# Patient Record
Sex: MALE | NOT HISPANIC OR LATINO | URBAN - METROPOLITAN AREA
[De-identification: names, ages, dates, MRNs, and addresses within clinical notes are randomized per-mention and may not be internally consistent; named-entity substitution may affect disease eponyms.]

---

## 2021-04-20 ENCOUNTER — OFFICE VISIT (OUTPATIENT)
Dept: URGENT CARE | Facility: CLINIC | Age: 46
End: 2021-04-20
Payer: COMMERCIAL

## 2021-04-20 VITALS — TEMPERATURE: 98.6 F | HEART RATE: 95 BPM | OXYGEN SATURATION: 95 % | RESPIRATION RATE: 16 BRPM

## 2021-04-20 DIAGNOSIS — J06.9 VIRAL URI WITH COUGH: ICD-10-CM

## 2021-04-20 DIAGNOSIS — Z11.59 SCREENING FOR ARTHROPOD-BORNE VIRAL DISEASE: ICD-10-CM

## 2021-04-20 DIAGNOSIS — J03.90 ACUTE TONSILLITIS, UNSPECIFIED ETIOLOGY: ICD-10-CM

## 2021-04-20 DIAGNOSIS — Z11.59 SPECIAL SCREENING EXAMINATION FOR UNSPECIFIED VIRAL DISEASE: Primary | ICD-10-CM

## 2021-04-20 PROCEDURE — 87070 CULTURE OTHR SPECIMN AEROBIC: CPT | Performed by: FAMILY MEDICINE

## 2021-04-20 PROCEDURE — U0003 INFECTIOUS AGENT DETECTION BY NUCLEIC ACID (DNA OR RNA); SEVERE ACUTE RESPIRATORY SYNDROME CORONAVIRUS 2 (SARS-COV-2) (CORONAVIRUS DISEASE [COVID-19]), AMPLIFIED PROBE TECHNIQUE, MAKING USE OF HIGH THROUGHPUT TECHNOLOGIES AS DESCRIBED BY CMS-2020-01-R: HCPCS | Performed by: FAMILY MEDICINE

## 2021-04-20 PROCEDURE — U0005 INFEC AGEN DETEC AMPLI PROBE: HCPCS | Performed by: FAMILY MEDICINE

## 2021-04-20 PROCEDURE — 99213 OFFICE O/P EST LOW 20 MIN: CPT | Performed by: FAMILY MEDICINE

## 2021-04-20 PROCEDURE — 87147 CULTURE TYPE IMMUNOLOGIC: CPT | Performed by: FAMILY MEDICINE

## 2021-04-20 RX ORDER — FEXOFENADINE HYDROCHLORIDE 60 MG/1
60 TABLET, FILM COATED ORAL DAILY
COMMUNITY

## 2021-04-20 NOTE — PROGRESS NOTES
[facility]        NAME: Amy Cazares is a 39 y o  male  : 1975    MRN: [id number]  DATE: 2021  TIME: 6:24 PM    Assessment and Plan   Special screening examination for unspecified viral disease [Z11 59]  1  Special screening examination for unspecified viral disease  Novel Coronavirus (Covid-19),PCR [facility] - Office Collection   2  Acute tonsillitis, unspecified etiology  Throat culture   3  Viral URI with cough     4  Screening for arthropod-borne viral disease           Patient Instructions       Follow up with PCP in 3-5 days  Proceed to  ER if symptoms worsen  Chief Complaint     Chief Complaint   Patient presents with    Sore Throat     Sore throat (painful, 6/10), HA (4/10, posterior skull), fatique x 2 days, fatique, sweating,   Hx strep throat yearly and spring allergies  Denies n/v/d, cough (beyond chronic cough), chills, change in appetitie, body aches, nasal congestion  Pt taking alegra           History of Present Illness        54-year-old male with a one-week history of cough, sore throat and fevers  He denies any nausea vomiting  He denies any headaches or diarrhea  Review of Systems   Review of Systems   Constitutional: Negative  HENT: Positive for sore throat  Eyes: Negative  Respiratory: Negative  Cardiovascular: Negative  Gastrointestinal: Negative  Genitourinary: Negative  Musculoskeletal: Positive for arthralgias and myalgias  Skin: Negative  Allergic/Immunologic: Negative  Neurological: Positive for headaches  Hematological: Negative  Psychiatric/Behavioral: Negative            Current Medications       Current Outpatient Medications:     fexofenadine (ALLEGRA) 60 MG tablet, Take 60 mg by mouth daily, Disp: , Rfl:     Current Allergies     Allergies as of 2021    (No Known Allergies)            The following portions of the patient's history were reviewed and updated as appropriate: allergies, current medications, past family history, past medical history, past social history, past surgical history and problem list      Past Medical History:   Diagnosis Date    Allergic     Allergic rhinitis        Past Surgical History:   Procedure Laterality Date    CYST REMOVAL         History reviewed  No pertinent family history  Medications have been verified  Objective   Pulse 95   Temp 98 6 °F (37 °C)   Resp 16   SpO2 95%   No LMP for male patient  Physical Exam     Physical Exam  Constitutional:       Appearance: He is well-developed  HENT:      Head: Normocephalic  Eyes:      Pupils: Pupils are equal, round, and reactive to light  Neck:      Musculoskeletal: Normal range of motion  Pulmonary:      Effort: Pulmonary effort is normal    Musculoskeletal: Normal range of motion  Skin:     General: Skin is warm  Neurological:      Mental Status: He is alert and oriented to person, place, and time

## 2021-04-21 LAB — SARS-COV-2 RNA RESP QL NAA+PROBE: NEGATIVE

## 2021-04-23 LAB — BACTERIA THROAT CULT: ABNORMAL

## 2022-09-06 ENCOUNTER — HOSPITAL ENCOUNTER (EMERGENCY)
Facility: HOSPITAL | Age: 47
Discharge: HOME | End: 2022-09-06
Attending: EMERGENCY MEDICINE
Payer: OTHER MISCELLANEOUS

## 2022-09-06 VITALS
TEMPERATURE: 98.4 F | HEIGHT: 71 IN | OXYGEN SATURATION: 96 % | RESPIRATION RATE: 18 BRPM | HEART RATE: 68 BPM | DIASTOLIC BLOOD PRESSURE: 65 MMHG | BODY MASS INDEX: 25.9 KG/M2 | SYSTOLIC BLOOD PRESSURE: 160 MMHG | WEIGHT: 185 LBS

## 2022-09-06 DIAGNOSIS — S69.90XA: Primary | ICD-10-CM

## 2022-09-06 PROCEDURE — 90715 TDAP VACCINE 7 YRS/> IM: CPT | Performed by: EMERGENCY MEDICINE

## 2022-09-06 PROCEDURE — 99283 EMERGENCY DEPT VISIT LOW MDM: CPT

## 2022-09-06 PROCEDURE — 90471 IMMUNIZATION ADMIN: CPT

## 2022-09-06 PROCEDURE — 99282 EMERGENCY DEPT VISIT SF MDM: CPT | Performed by: EMERGENCY MEDICINE

## 2022-09-06 RX ADMIN — TETANUS TOXOID, REDUCED DIPHTHERIA TOXOID AND ACELLULAR PERTUSSIS VACCINE, ADSORBED 0.5 ML: 5; 2.5; 8; 8; 2.5 SUSPENSION INTRAMUSCULAR at 10:16

## 2022-09-06 NOTE — Clinical Note
Jakob Rah was seen and treated in our emergency department on 9/6/2022             no use of left hand    Diagnosis: finger / nail laceration    Aleida Nurse    He may return on this date: 09/12/2022         If you have any questions or concerns, please don't hesitate to call        Gabbi Carlin, DO    ______________________________           _______________          _______________  Hospital Representative                              Date                                Time

## 2022-09-06 NOTE — ED PROVIDER NOTES
History  Chief Complaint   Patient presents with    Finger Pain     PT reports having a screw enter left thumb under the finger nail  Bleeding appears controlled at present time  Pt not sure when tetanus was up to date     Patient is a 54 yo M, right hand dominant, who presents with left thumbnail injury that occurred prior to arrival to the ER  Patient was working with screws and a screw accidentally entered underneath his left thumbnail  It bled a bit in the beginning, and then he washed the wound out with soapy water and it stopped  Denies any numbness, tingling, weakness, fevers, chills, redness, pus  Unknown last tetanus  Prior to Admission Medications   Prescriptions Last Dose Informant Patient Reported? Taking?   fexofenadine (ALLEGRA) 60 MG tablet   Yes No   Sig: Take 60 mg by mouth daily      Facility-Administered Medications: None       Past Medical History:   Diagnosis Date    Allergic     Allergic rhinitis        Past Surgical History:   Procedure Laterality Date    CYST REMOVAL         History reviewed  No pertinent family history  I have reviewed and agree with the history as documented  E-Cigarette/Vaping    E-Cigarette Use Never User      E-Cigarette/Vaping Substances     Social History     Tobacco Use    Smoking status: Current Every Day Smoker     Packs/day: 0 50     Types: Cigarettes    Smokeless tobacco: Never Used   Vaping Use    Vaping Use: Never used   Substance Use Topics    Alcohol use: Not Currently     Comment: rarely    Drug use: Yes     Types: Marijuana     Comment: daily use       Review of Systems   Constitutional: Negative for chills and fever  Gastrointestinal: Negative for nausea and vomiting  Skin: Positive for wound  Negative for color change  Neurological: Negative for weakness and numbness  Physical Exam  Physical Exam  Vitals and nursing note reviewed  Constitutional:       General: He is not in acute distress       Appearance: Normal appearance  He is not ill-appearing, toxic-appearing or diaphoretic  HENT:      Head: Normocephalic and atraumatic  Mouth/Throat:      Mouth: Mucous membranes are moist    Eyes:      Conjunctiva/sclera: Conjunctivae normal       Pupils: Pupils are equal, round, and reactive to light  Cardiovascular:      Rate and Rhythm: Normal rate and regular rhythm  Pulmonary:      Effort: Pulmonary effort is normal  No respiratory distress  Abdominal:      Palpations: Abdomen is soft  Tenderness: There is no abdominal tenderness  Musculoskeletal:      Left hand: Laceration and tenderness present  No swelling, deformity or bony tenderness  Normal range of motion  Normal strength  Normal sensation  There is no disruption of two-point discrimination  Normal capillary refill  Normal pulse  Hands:    Skin:     General: Skin is warm and dry  Neurological:      General: No focal deficit present  Mental Status: He is alert and oriented to person, place, and time  Mental status is at baseline     Psychiatric:         Mood and Affect: Mood normal          Behavior: Behavior normal          Vital Signs  ED Triage Vitals   Temperature Pulse Respirations Blood Pressure SpO2   09/06/22 0946 09/06/22 0946 09/06/22 0946 09/06/22 1001 09/06/22 0946   98 4 °F (36 9 °C) 70 18 164/65 99 %      Temp Source Heart Rate Source Patient Position - Orthostatic VS BP Location FiO2 (%)   09/06/22 0946 09/06/22 0946 -- -- --   Oral Monitor         Pain Score       09/06/22 0946       2           Vitals:    09/06/22 0946 09/06/22 1001 09/06/22 1008   BP:  164/65 160/65   Pulse: 70 68          Visual Acuity      ED Medications  Medications   tetanus-diphtheria-acellular pertussis (BOOSTRIX) IM injection 0 5 mL (0 5 mL Intramuscular Given 9/6/22 1016)       Diagnostic Studies  Results Reviewed     None                 No orders to display              Procedures  Procedures         ED Course MDM  Number of Diagnoses or Management Options  Injury of nail bed of thumb, initial encounter  Diagnosis management comments: A/P:  Left thumbnail is grossly intact, lifts slightly over the distal portion in the center, but is grounded bilaterally on the sides and proximal nail completely intact, there is residual subungual hematoma that appears to have drained, no finger pad injury  Will update tetanus, irrigate wound with sterile saline  Discussed with patient that the finger should be wrapped to avoid nail from lifting more as concern is about preserving the proximal end of the nail, keep clean and dry, avoid manual labor for a few days to avoid worsening injury to the nail  Counseled regarding strict return precautions which patient verbalized understanding of  Disposition  Final diagnoses:   Injury of nail bed of thumb, initial encounter     Time reflects when diagnosis was documented in both MDM as applicable and the Disposition within this note     Time User Action Codes Description Comment    9/6/2022 10:08 AM Jason Del Rio Add [S69 [id number]] Injury of nail bed of thumb, initial encounter       ED Disposition     ED Disposition   Discharge    Condition   Stable    Date/Time   Tue Sep 6, 2022 10:07 AM    Comment   Jonatan Srivastava discharge to home/self care                 Follow-up Information     Follow up With Specialties Details Why Contact Info Additional Alex Sands [address] [facility] Emergency Medicine Go to  As needed, If symptoms worsen, for re-evaluation [address] [place],[address] [id number]  [address] [facility] Emergency Department, [address]    Shyanne Spencer MD Orthopedic Surgery, Hand Surgery Schedule an appointment as soon as possible for a visit in 1 week for re-evaluation [address] [facility] [address]  [phone]             Discharge Medication List as of 9/6/2022 10:12 AM      CONTINUE these medications which have NOT CHANGED    Details   fexofenadine (ALLEGRA) 60 MG tablet Take 60 mg by mouth daily, Historical Med             No discharge procedures on file      PDMP Review     None          ED Provider  Electronically Signed by           Donavon Flower DO  09/06/22 1029

## 2022-09-06 NOTE — DISCHARGE INSTRUCTIONS
Please keep wound clean and dry  Keep wrapped in order to prevent nail from lifting  Return to the ER for the following, but not limited to worsening pain, bleeding, redness, swelling, pus, fevers

## 2022-09-06 NOTE — ED NOTES
PT left thumb soaked in saline and wrapped in gauze per provider verbal order, tolerated well       Volodymyr Granger RN  09/06/22 1614

## 2022-10-12 PROBLEM — J06.9 VIRAL URI WITH COUGH: Status: RESOLVED | Noted: 2021-04-20 | Resolved: 2022-10-12

## 2022-10-12 PROBLEM — Z11.59 SCREENING FOR ARTHROPOD-BORNE VIRAL DISEASE: Status: RESOLVED | Noted: 2021-04-20 | Resolved: 2022-10-12
